# Patient Record
Sex: MALE | Race: WHITE | Employment: OTHER | URBAN - METROPOLITAN AREA
[De-identification: names, ages, dates, MRNs, and addresses within clinical notes are randomized per-mention and may not be internally consistent; named-entity substitution may affect disease eponyms.]

---

## 2017-01-01 ENCOUNTER — HOSPITAL ENCOUNTER (EMERGENCY)
Age: 82
End: 2017-01-25
Attending: EMERGENCY MEDICINE
Payer: SELF-PAY

## 2017-01-01 ENCOUNTER — APPOINTMENT (OUTPATIENT)
Dept: GENERAL RADIOLOGY | Age: 82
End: 2017-01-01
Attending: EMERGENCY MEDICINE
Payer: SELF-PAY

## 2017-01-01 ENCOUNTER — APPOINTMENT (OUTPATIENT)
Dept: CT IMAGING | Age: 82
End: 2017-01-01
Attending: EMERGENCY MEDICINE
Payer: SELF-PAY

## 2017-01-01 VITALS
WEIGHT: 100 LBS | HEART RATE: 77 BPM | BODY MASS INDEX: 15.16 KG/M2 | DIASTOLIC BLOOD PRESSURE: 77 MMHG | OXYGEN SATURATION: 100 % | RESPIRATION RATE: 22 BRPM | HEIGHT: 68 IN | SYSTOLIC BLOOD PRESSURE: 105 MMHG

## 2017-01-01 DIAGNOSIS — T50.902A SUICIDAL OVERDOSE, INITIAL ENCOUNTER (HCC): Primary | ICD-10-CM

## 2017-01-01 LAB
ALBUMIN SERPL BCP-MCNC: 2.7 G/DL (ref 3.5–5)
ALBUMIN/GLOB SERPL: 0.7 {RATIO} (ref 1.1–2.2)
ALP SERPL-CCNC: 81 U/L (ref 45–117)
ALT SERPL-CCNC: 25 U/L (ref 12–78)
ANION GAP BLD CALC-SCNC: 9 MMOL/L (ref 5–15)
APAP SERPL-MCNC: <2 UG/ML (ref 10–30)
ARTERIAL PATENCY WRIST A: YES
AST SERPL W P-5'-P-CCNC: 23 U/L (ref 15–37)
BASE DEFICIT BLD-SCNC: 24 MMOL/L
BASOPHILS # BLD AUTO: 0 K/UL (ref 0–0.1)
BASOPHILS # BLD: 0 % (ref 0–1)
BDY SITE: ABNORMAL
BILIRUB SERPL-MCNC: 0.3 MG/DL (ref 0.2–1)
BUN SERPL-MCNC: 52 MG/DL (ref 6–20)
BUN/CREAT SERPL: 12 (ref 12–20)
CALCIUM SERPL-MCNC: 8 MG/DL (ref 8.5–10.1)
CHLORIDE SERPL-SCNC: 111 MMOL/L (ref 97–108)
CK MB CFR SERPL CALC: 9.6 % (ref 0–2.5)
CK MB SERPL-MCNC: 8.7 NG/ML (ref 5–25)
CK SERPL-CCNC: 91 U/L (ref 39–308)
CO2 SERPL-SCNC: 23 MMOL/L (ref 21–32)
CREAT SERPL-MCNC: 4.22 MG/DL (ref 0.7–1.3)
EOSINOPHIL # BLD: 0.2 K/UL (ref 0–0.4)
EOSINOPHIL NFR BLD: 1 % (ref 0–7)
ERYTHROCYTE [DISTWIDTH] IN BLOOD BY AUTOMATED COUNT: 15.3 % (ref 11.5–14.5)
ETHANOL SERPL-MCNC: <10 MG/DL
GAS FLOW.O2 O2 DELIVERY SYS: ABNORMAL L/MIN
GAS FLOW.O2 SETTING OXYMISER: 12 BPM
GLOBULIN SER CALC-MCNC: 4.1 G/DL (ref 2–4)
GLUCOSE BLD STRIP.AUTO-MCNC: 158 MG/DL (ref 65–100)
GLUCOSE SERPL-MCNC: 99 MG/DL (ref 65–100)
HCO3 BLD-SCNC: 11.6 MMOL/L (ref 22–26)
HCT VFR BLD AUTO: 32 % (ref 36.6–50.3)
HGB BLD-MCNC: 10 G/DL (ref 12.1–17)
INR PPP: 1 (ref 0.9–1.1)
LIPASE SERPL-CCNC: 144 U/L (ref 73–393)
LYMPHOCYTES # BLD AUTO: 12 % (ref 12–49)
LYMPHOCYTES # BLD: 1.5 K/UL (ref 0.8–3.5)
MCH RBC QN AUTO: 28.5 PG (ref 26–34)
MCHC RBC AUTO-ENTMCNC: 31.3 G/DL (ref 30–36.5)
MCV RBC AUTO: 91.2 FL (ref 80–99)
MONOCYTES # BLD: 1 K/UL (ref 0–1)
MONOCYTES NFR BLD AUTO: 8 % (ref 5–13)
NEUTS SEG # BLD: 10 K/UL (ref 1.8–8)
NEUTS SEG NFR BLD AUTO: 79 % (ref 32–75)
O2/TOTAL GAS SETTING VFR VENT: 100 %
PCO2 BLD: 85.3 MMHG (ref 35–45)
PEEP RESPIRATORY: 5 CMH2O
PH BLD: 6.74 [PH] (ref 7.35–7.45)
PLATELET # BLD AUTO: 322 K/UL (ref 150–400)
PO2 BLD: 57 MMHG (ref 80–100)
POTASSIUM SERPL-SCNC: 4.4 MMOL/L (ref 3.5–5.1)
PROT SERPL-MCNC: 6.8 G/DL (ref 6.4–8.2)
PROTHROMBIN TIME: 10 SEC (ref 9–11.1)
RBC # BLD AUTO: 3.51 M/UL (ref 4.1–5.7)
SALICYLATES SERPL-MCNC: <1.7 MG/DL (ref 2.8–20)
SAO2 % BLD: 55 % (ref 92–97)
SERVICE CMNT-IMP: ABNORMAL
SODIUM SERPL-SCNC: 143 MMOL/L (ref 136–145)
SPECIMEN TYPE: ABNORMAL
TROPONIN I SERPL-MCNC: 2.18 NG/ML
VENTILATION MODE VENT: ABNORMAL
VT SETTING VENT: 400 ML
WBC # BLD AUTO: 12.6 K/UL (ref 4.1–11.1)

## 2017-01-01 PROCEDURE — 99285 EMERGENCY DEPT VISIT HI MDM: CPT

## 2017-01-01 PROCEDURE — 85025 COMPLETE CBC W/AUTO DIFF WBC: CPT | Performed by: EMERGENCY MEDICINE

## 2017-01-01 PROCEDURE — 84484 ASSAY OF TROPONIN QUANT: CPT | Performed by: EMERGENCY MEDICINE

## 2017-01-01 PROCEDURE — 36600 WITHDRAWAL OF ARTERIAL BLOOD: CPT

## 2017-01-01 PROCEDURE — 80307 DRUG TEST PRSMV CHEM ANLYZR: CPT | Performed by: EMERGENCY MEDICINE

## 2017-01-01 PROCEDURE — 74011250636 HC RX REV CODE- 250/636

## 2017-01-01 PROCEDURE — 74011000258 HC RX REV CODE- 258: Performed by: EMERGENCY MEDICINE

## 2017-01-01 PROCEDURE — 82962 GLUCOSE BLOOD TEST: CPT

## 2017-01-01 PROCEDURE — 83690 ASSAY OF LIPASE: CPT | Performed by: EMERGENCY MEDICINE

## 2017-01-01 PROCEDURE — 93005 ELECTROCARDIOGRAM TRACING: CPT

## 2017-01-01 PROCEDURE — 36415 COLL VENOUS BLD VENIPUNCTURE: CPT | Performed by: EMERGENCY MEDICINE

## 2017-01-01 PROCEDURE — 70450 CT HEAD/BRAIN W/O DYE: CPT

## 2017-01-01 PROCEDURE — 94002 VENT MGMT INPAT INIT DAY: CPT

## 2017-01-01 PROCEDURE — 85610 PROTHROMBIN TIME: CPT | Performed by: EMERGENCY MEDICINE

## 2017-01-01 PROCEDURE — 77030008683 HC TU ET CUF COVD -A

## 2017-01-01 PROCEDURE — 77030022643 HC PAD DEFIB AD HRTSTRT PHL -B

## 2017-01-01 PROCEDURE — 74011250636 HC RX REV CODE- 250/636: Performed by: EMERGENCY MEDICINE

## 2017-01-01 PROCEDURE — 94762 N-INVAS EAR/PLS OXIMTRY CONT: CPT

## 2017-01-01 PROCEDURE — 82550 ASSAY OF CK (CPK): CPT | Performed by: EMERGENCY MEDICINE

## 2017-01-01 PROCEDURE — 92950 HEART/LUNG RESUSCITATION CPR: CPT

## 2017-01-01 PROCEDURE — 74011000250 HC RX REV CODE- 250: Performed by: EMERGENCY MEDICINE

## 2017-01-01 PROCEDURE — 96374 THER/PROPH/DIAG INJ IV PUSH: CPT

## 2017-01-01 PROCEDURE — 77030008768 HC TU NG VYGC -A

## 2017-01-01 PROCEDURE — 96361 HYDRATE IV INFUSION ADD-ON: CPT

## 2017-01-01 PROCEDURE — 82803 BLOOD GASES ANY COMBINATION: CPT

## 2017-01-01 PROCEDURE — 80053 COMPREHEN METABOLIC PANEL: CPT | Performed by: EMERGENCY MEDICINE

## 2017-01-01 PROCEDURE — 31500 INSERT EMERGENCY AIRWAY: CPT

## 2017-01-01 RX ORDER — ATROPINE SULFATE 1 MG/ML
INJECTION, SOLUTION INTRAVENOUS
Status: COMPLETED | OUTPATIENT
Start: 2017-01-01 | End: 2017-01-01

## 2017-01-01 RX ORDER — ETOMIDATE 2 MG/ML
20 INJECTION INTRAVENOUS
Status: COMPLETED | OUTPATIENT
Start: 2017-01-01 | End: 2017-01-01

## 2017-01-01 RX ORDER — EPINEPHRINE 0.1 MG/ML
INJECTION INTRACARDIAC; INTRAVENOUS
Status: COMPLETED | OUTPATIENT
Start: 2017-01-01 | End: 2017-01-01

## 2017-01-01 RX ORDER — LORAZEPAM 2 MG/ML
INJECTION INTRAMUSCULAR
Status: COMPLETED
Start: 2017-01-01 | End: 2017-01-01

## 2017-01-01 RX ORDER — HEPARIN SODIUM 10000 [USP'U]/100ML
12-25 INJECTION, SOLUTION INTRAVENOUS
Status: DISCONTINUED | OUTPATIENT
Start: 2017-01-01 | End: 2017-01-26 | Stop reason: HOSPADM

## 2017-01-01 RX ORDER — ASPIRIN 300 MG/1
300 SUPPOSITORY RECTAL
Status: DISCONTINUED | OUTPATIENT
Start: 2017-01-01 | End: 2017-01-26 | Stop reason: HOSPADM

## 2017-01-01 RX ORDER — ROCURONIUM BROMIDE 10 MG/ML
100 INJECTION, SOLUTION INTRAVENOUS
Status: COMPLETED | OUTPATIENT
Start: 2017-01-01 | End: 2017-01-01

## 2017-01-01 RX ORDER — LORAZEPAM 2 MG/ML
2 INJECTION INTRAMUSCULAR
Status: COMPLETED | OUTPATIENT
Start: 2017-01-01 | End: 2017-01-01

## 2017-01-01 RX ORDER — SODIUM BICARBONATE 1 MEQ/ML
SYRINGE (ML) INTRAVENOUS
Status: COMPLETED | OUTPATIENT
Start: 2017-01-01 | End: 2017-01-01

## 2017-01-01 RX ORDER — SODIUM BICARBONATE 1 MEQ/ML
50 SYRINGE (ML) INTRAVENOUS
Status: COMPLETED | OUTPATIENT
Start: 2017-01-01 | End: 2017-01-01

## 2017-01-01 RX ORDER — PROPOFOL 10 MG/ML
5-50 VIAL (ML) INTRAVENOUS
Status: DISCONTINUED | OUTPATIENT
Start: 2017-01-01 | End: 2017-01-26 | Stop reason: HOSPADM

## 2017-01-01 RX ADMIN — EPINEPHRINE 1 MG: 0.1 INJECTION, SOLUTION ENDOTRACHEAL; INTRACARDIAC; INTRAVENOUS at 19:16

## 2017-01-01 RX ADMIN — ROCURONIUM BROMIDE 100 MG: 10 INJECTION, SOLUTION INTRAVENOUS at 18:55

## 2017-01-01 RX ADMIN — EPINEPHRINE 1 MG: 0.1 INJECTION, SOLUTION ENDOTRACHEAL; INTRACARDIAC; INTRAVENOUS at 20:18

## 2017-01-01 RX ADMIN — I.V. FAT EMULSION 500 ML/HR: 20 EMULSION INTRAVENOUS at 19:58

## 2017-01-01 RX ADMIN — SODIUM CHLORIDE 1000 ML: 900 INJECTION, SOLUTION INTRAVENOUS at 19:13

## 2017-01-01 RX ADMIN — LORAZEPAM 2 MG: 2 INJECTION INTRAMUSCULAR at 18:55

## 2017-01-01 RX ADMIN — POISON ADSORBENT 50 G: 50 SUSPENSION ORAL at 19:06

## 2017-01-01 RX ADMIN — I.V. FAT EMULSION 68.1 ML: 20 EMULSION INTRAVENOUS at 19:45

## 2017-01-01 RX ADMIN — EPINEPHRINE 1 MG: 0.1 INJECTION, SOLUTION ENDOTRACHEAL; INTRACARDIAC; INTRAVENOUS at 19:26

## 2017-01-01 RX ADMIN — SODIUM BICARBONATE 50 MEQ: 84 INJECTION, SOLUTION INTRAVENOUS at 19:26

## 2017-01-01 RX ADMIN — EPINEPHRINE 1 MG: 0.1 INJECTION, SOLUTION ENDOTRACHEAL; INTRACARDIAC; INTRAVENOUS at 19:49

## 2017-01-01 RX ADMIN — EPINEPHRINE 1 MG: 0.1 INJECTION, SOLUTION ENDOTRACHEAL; INTRACARDIAC; INTRAVENOUS at 19:22

## 2017-01-01 RX ADMIN — EPINEPHRINE 3 MCG/MIN: 1 INJECTION, SOLUTION INTRAMUSCULAR; INTRAVENOUS; SUBCUTANEOUS at 19:48

## 2017-01-01 RX ADMIN — SODIUM BICARBONATE 50 MEQ: 84 INJECTION INTRAVENOUS at 20:03

## 2017-01-01 RX ADMIN — ATROPINE SULFATE 1 MG: 1 INJECTION, SOLUTION INTRAMUSCULAR; INTRAVENOUS; SUBCUTANEOUS at 19:07

## 2017-01-01 RX ADMIN — EPINEPHRINE 1 MG: 0.1 INJECTION, SOLUTION ENDOTRACHEAL; INTRACARDIAC; INTRAVENOUS at 19:07

## 2017-01-01 RX ADMIN — NOREPINEPHRINE BITARTRATE 4 MCG/MIN: 1 INJECTION, SOLUTION, CONCENTRATE INTRAVENOUS at 19:13

## 2017-01-01 RX ADMIN — LORAZEPAM 2 MG: 2 INJECTION INTRAMUSCULAR; INTRAVENOUS at 18:55

## 2017-01-01 RX ADMIN — EPINEPHRINE 1 MG: 0.1 INJECTION, SOLUTION ENDOTRACHEAL; INTRACARDIAC; INTRAVENOUS at 19:30

## 2017-01-01 RX ADMIN — ETOMIDATE 20 MG: 2 INJECTION, SOLUTION INTRAVENOUS at 18:55

## 2017-01-25 NOTE — ED NOTES
Pt became unresponsive and then had tonic clonic seizure lasting approximately 1-2 minutes. Pt suctioned and NRB placed. Dr Jeannine Kwok to bedside.  RSI at bedside, preparing for intubation

## 2017-01-25 NOTE — ED PROVIDER NOTES
HPI Comments: 80 y.o. male with past medical history significant for bladder cancer who presents via EMS with chief complaint of overdose. Per EMS, pt claims he took [de-identified] wellbutrin in an attempt to kill himself approximately 1 hour ago. Pt denies any prior hx of suicide attempts. There are no other acute medical concerns at this time. Social hx: Pt states he smokes. PCP: No primary care provider on file. Note written by Deandra Mcfadden, as dictated by Emelia Rinaldi MD 6:45 PM      The history is provided by the patient. No  was used. Past Medical History:   Diagnosis Date    [facility] ([facility]     Chronic obstructive pulmonary disease ([facility]     Depression     Hypertension     Kidney disease        History reviewed. No pertinent past surgical history. History reviewed. No pertinent family history. Social History     Social History    Marital status: N/A     Spouse name: N/A    Number of children: N/A    Years of education: N/A     Occupational History    Not on file. Social History Main Topics    Smoking status: Not on file    Smokeless tobacco: Not on file    Alcohol use Not on file    Drug use: Not on file    Sexual activity: Not on file     Other Topics Concern    Not on file     Social History Narrative         ALLERGIES: Review of patient's allergies indicates not on file. Review of Systems   Constitutional: Negative for chills, diaphoresis, fatigue and fever. HENT: Negative for congestion, rhinorrhea, sinus pressure, sore throat, trouble swallowing and voice change. Eyes: Negative for photophobia and visual disturbance. Respiratory: Negative for cough, chest tightness and shortness of breath. Cardiovascular: Negative for chest pain, palpitations and leg swelling. Gastrointestinal: Negative for abdominal pain, blood in stool, constipation, diarrhea, nausea and vomiting.    Musculoskeletal: Negative for arthralgias, myalgias and neck pain. Neurological: Negative for dizziness, weakness, light-headedness, numbness and headaches. Psychiatric/Behavioral: Positive for suicidal ideas. All other systems reviewed and are negative. Vitals:    01/25/17 1856 01/25/17 1904 01/25/17 1905 01/25/17 2000   BP:  (!) 51/40  105/77   Pulse:  (!) 46 64 87   Resp:  19 12 12   SpO2:   (!) 10% 97%   Weight: 45.4 kg (100 lb)      Height: 5' 8\" (1.727 m)               Physical Exam   Constitutional: He is oriented to person, place, and time. He appears well-developed and well-nourished. No distress. 'yes - I'm trying to kill myself'    BIBEMS NAD, AxOx4, speaking in complete sentences, carrera in place;      HENT:   Head: Normocephalic and atraumatic. Nose: Nose normal.   Mouth/Throat: Oropharynx is clear and moist.   Cn intact     Eyes: Conjunctivae and EOM are normal. Pupils are equal, round, and reactive to light. Right eye exhibits no discharge. Left eye exhibits no discharge. Neck: Normal range of motion. Neck supple. Cardiovascular: Normal rate, regular rhythm, normal heart sounds and intact distal pulses. Exam reveals no gallop and no friction rub. No murmur heard. Pulmonary/Chest: Effort normal and breath sounds normal. No respiratory distress. He has no wheezes. He has no rales. He exhibits no tenderness. Abdominal: Soft. Bowel sounds are normal. He exhibits no distension and no mass. There is no tenderness. There is no rebound and no guarding. nttp     Genitourinary:   Genitourinary Comments: Chronic carrera/ placed at [facility];    Musculoskeletal: Normal range of motion. He exhibits no edema, tenderness or deformity. Lymphadenopathy:     He has no cervical adenopathy. Neurological: He is alert and oriented to person, place, and time. He has normal reflexes. No cranial nerve deficit. Coordination normal.   pt has motor/ CV/ Sensation grossly intact to all extremities, R = L in strength;   Skin: Skin is warm and dry.  No rash noted. No erythema. Psychiatric: He has a normal mood and affect. Nursing note and vitals reviewed. MDM  Number of Diagnoses or Management Options  Suicidal overdose, initial encounter [facility]):   Risk of Complications, Morbidity, and/or Mortality  Presenting problems: high  Diagnostic procedures: high  Management options: high    Critical Care  Total time providing critical care:  minutes (75 minutes)    Patient Progress  Patient progress: other (comment)    ED Course       Procedures     Chief Complaint   Patient presents with    Drug Overdose       8:08 PM  The patients presenting problems have been discussed, and they are in agreement with the care plan formulated and outlined with them. I have encouraged them to ask questions as they arise throughout their visit.     MEDICATIONS GIVEN:  Medications   sodium chloride 0.9 % bolus infusion 1,000 mL (1,000 mL IntraVENous New Bag 1/25/17 1913)   propofol (DIPRIVAN) infusion (not administered)   NOREPINephrine (LEVOPHED) 8,000 mcg in dextrose 5% 250 mL infusion (19 mcg/min IntraVENous Rate Change 1/25/17 1942)   fat emulsion 20% (LIPOSYN, INTRALIPID) infusion (not administered)   activated charcoal-sorbitol (ACTIDOSE) oral suspension 50 g (50 g Oral Given by Provider 1/25/17 1906)   rocuronium [facility]) injection 100 mg (100 mg IntraVENous Given 1/25/17 1854)   etomidate (AMIDATE) 2 mg/mL injection 20 mg (20 mg IntraVENous Given 1/25/17 1854)   LORazepam (ATIVAN) injection 2 mg (2 mg IntraVENous Given 1/25/17 1855)   atropine 1 mg/mL injection (1 mg IntraVENous Given 1/25/17 1907)   EPINEPHrine (ADRENALIN) 0.1 mg/mL syringe (1 mg IntraVENous Given 1/25/17 1907)   EPINEPHrine (ADRENALIN) 0.1 mg/mL syringe (1 mg IntraVENous Given 1/25/17 1949)   sodium bicarbonate 8.4 % (1 mEq/mL) injection (50 mEq IntraVENous Given 1/25/17 1926)   fat emulsion 20% (LIPOSYN, INTRALIPID) infusion 68.1 mL (68.1 mL IntraVENous Given 1/25/17 1945)   EPINEPHrine (ADRENALIN) 4,000 mcg in 0.9% sodium chloride 250 mL infusion (10 mcg/min  Rate Change 1/25/17 2003)   sodium bicarbonate 8.4 % (1 mEq/mL) injection 50 mEq (50 mEq IntraVENous Given 1/25/17 2003)       LABS REVIEWED:  Labs Reviewed   TROPONIN I - Abnormal; Notable for the following:        Result Value    Troponin-I, Qt. 2.18 (*)     All other components within normal limits   CBC WITH AUTOMATED DIFF - Abnormal; Notable for the following:     WBC 12.6 (*)     RBC 3.51 (*)     HGB 10.0 (*)     HCT 32.0 (*)     RDW 15.3 (*)     NEUTROPHILS 79 (*)     ABS. NEUTROPHILS 10.0 (*)     All other components within normal limits   CK W/ CKMB & INDEX - Abnormal; Notable for the following:     CK - MB 8.7 (*)     CK-MB Index 9.6 (*)     All other components within normal limits   METABOLIC PANEL, COMPREHENSIVE - Abnormal; Notable for the following:     Chloride 111 (*)     BUN 52 (*)     Creatinine 4.22 (*)     GFR est AA 16 (*)     GFR est non-AA 14 (*)     Calcium 8.0 (*)     Albumin 2.7 (*)     Globulin 4.1 (*)     A-G Ratio 0.7 (*)     All other components within normal limits   SALICYLATE - Abnormal; Notable for the following:     SALICYLATE <7.3 (*)     All other components within normal limits   ACETAMINOPHEN - Abnormal; Notable for the following:     ACETAMINOPHEN <2 (*)     All other components within normal limits   GLUCOSE, POC - Abnormal; Notable for the following:     Glucose (POC) 158 (*)     All other components within normal limits   PROTHROMBIN TIME + INR   ETHYL ALCOHOL   LIPASE   URINALYSIS W/MICROSCOPIC   DRUG SCREEN, URINE       RADIOLOGY RESULTS:  The following have been ordered and reviewed:  _____________________________________________________________________  _____________________________________________________________________    EKG interpretation: (Preliminary)  Rhythm:  rhythm; and regular .  Rate (approx.): 86; Axis: LAD; P wave: normal; QRS interval: normal ; ST/T wave: normal; Negative acute significant segmental elevations/ noted QRS widening/ inverted t waves/ st depressions in leads V4/ V5; no old study/ QTc = 485    PROCEDURES:        CONSULTATIONS:       PROGRESS NOTES:      DIAGNOSIS:    1. Suicidal overdose, initial encounter [facility])        PLAN:  1-  Suicide by wellbutrin; completed;       ED COURSE: The patients hospital course has been uncomplicated. 6:50   Spoke with [facility]; monitor for seizures;     PROGRESS NOTE:  6:49 PM  Pt went into seizure for approximately 1 min    PROGRESS NOTE:  6:53 PM  Pt seizing again    PROGRESS NOTE:  7:06 PM  Pt went into arrest, compressions started    PROGRESS NOTE:  7:16 PM  Pt went into arrest against, compressions started. No pulse noted    PROGRESS NOTE:  7:37 PM  Dr. Dominga Davalos has seen EKG, calling code ICE. Will call hospitalist    7:30 PM  [facility] contacted again/ notified about arresting/ change in status / rec intralipid be given;     PROGRESS NOTE:  7:44 PM  No pulse, compressions started    PROGRESS NOTE:  7:57 PM  Caregiver states they found pt with pills in his hand at 1730. Updated pt's caregiver and friend of poor prognosis. Dr. Dominga Davalos talking with caregiver as well. Taking pt to head CT    ED Subsquent EKG interpretation:  Rhythm: normal sinus rhythm with a 1st degree AV block; and regular . Rate (approx.): 94; Axis: left axis deviation; P wave: 1st degree AV Block ; QRS interval: normal ; ST/T wave: significant depressions in leads V3- V6; ; in  Lead: ; Other findings: abnormal ekg. This EKG was interpreted by Shan Sen MD,ED Provider. CONSULT NOTE:  8:13 PM Shan Sen MD spoke with Dr. Zarate Her, Consult for Hospitalist.  Discussed available diagnostic tests and clinical findings. He is in agreement with care plans as outlined. Dr. Zarate Her will admit. PROGRESS NOTE:  8:22 PM  Pt head CT show bilateral thalamic Infarcts. Pt returned from CT and coded again. Utilized ultrasound, which showed cardiac activity inconsistent with life.  Pt pronounced dead at 20:22;     8:44 PM  Spoke with ME x 15 minutes;

## 2017-01-25 NOTE — ED TRIAGE NOTES
Triage Note: [facility] [place]. He reports taking overdose of wellbutrin (approximately 80) in attempt to kill himself. Pt has bladder cancer. He says he is tired of living.

## 2017-01-26 LAB
ATRIAL RATE: 87 BPM
ATRIAL RATE: 94 BPM
CALCULATED P AXIS, ECG09: -12 DEGREES
CALCULATED R AXIS, ECG10: -69 DEGREES
CALCULATED R AXIS, ECG10: -71 DEGREES
CALCULATED T AXIS, ECG11: 104 DEGREES
CALCULATED T AXIS, ECG11: 110 DEGREES
DIAGNOSIS, 93000: NORMAL
DIAGNOSIS, 93000: NORMAL
P-R INTERVAL, ECG05: 326 MS
Q-T INTERVAL, ECG07: 406 MS
Q-T INTERVAL, ECG07: 406 MS
QRS DURATION, ECG06: 118 MS
QRS DURATION, ECG06: 120 MS
QTC CALCULATION (BEZET), ECG08: 485 MS
QTC CALCULATION (BEZET), ECG08: 507 MS
VENTRICULAR RATE, ECG03: 86 BPM
VENTRICULAR RATE, ECG03: 94 BPM

## 2017-01-26 NOTE — CONSULTS
[address] [facility] [address]    [facility]       Name:  Yesica Perez   MR#:  [id number]   :  10/03/1932   Account #:  [de-identified]    Date of Consultation:  2017   Date of Adm:  2017       HISTORY OF PRESENT ILLNESS: I was called to see this patient   when he had a cardiac arrest in the emergency room and was being   resuscitated, I got there, Dr. Chadwick Au indicates that the patient was   awake and alert, and talking to him when he got to the emergency   room by rescue squad after ingesting approximately 80 pills of   Wellbutrin. The patient expressed to Dr. Chadwick Au that he did not want to   live anymore and he apparently told Dr. Chadwick Au there he had bladder   cancer. The patient had a seizure and then went into a PEA arrest and   was resuscitated multiple times before return of spontaneous   circulation. The patient gets his medical care at the [facility] and   was recently there for a several day stay for reasons that are not   entirely clear. He lives with a woman who is basically has caretaker. They have been apparently friends for a long time and she took over   his care when he became homeless. In any event, one of the patient's   problems that was identified at the [facility] was renal failure and   the doctors at the [facility] told the patient that they would not offer   hemodialysis because \"it would not do him any good. \" We have no   other specific medical history on this patient. PHYSICAL EXAMINATION   GENERAL: This is a well-developed, very small, very light man who at   the time I saw him, he was getting CPR with chest compressions. VITAL SIGNS: Included a heart rate of about 100. Blood pressure with   Levophed support in the 80s and 90s. HEENT: Unremarkable. NECK: Veins are not distended. CHEST WALL: Small. LUNGS: Clear when he was being bagged. HEART: Sounds were regular, moderate, somewhat muffled.  No S3   gallop or friction rub. No significant murmurs. ABDOMEN: Distended and soft with return of spontaneous circulation   and his femoral artery pulses were palpable. EXTREMITIES: With no edema. NEUROLOGICAL: Is deferred as the patient is unconscious. LABORATORY DATA: Hemoglobin is 10.0, hematocrit 32.0, platelets   111,302. White count 12,600. Chemistries: BUN 52, creatinine 4.22,   potassium 4.4. Troponin is 2.18. Blood gases at 8 o'clock pH 6.741,   pCO2 at 85, pO2 at 57. DIAGNOSTIC DATA: His EKG demonstrates sinus rhythm, left axis   deviation and ST-segment depression in the lateral leads, and a   widened QRS complex. IMPRESSION:   1. The patient had a PEA arrest after seizure activity, presumably   brought on by the ingestion of overdose of Wellbutrin. 2. The patient is not awake and has no spontaneous movement, and   degree of recovery is unclear. After discussion with Dr. Chadiwck Au, the patient will be admitted to the   hospitalist service and placed on a CODE ICE policy. There is   apparently no family and only his friend who is caretaker was there to   give us any information. We will request records from the [place] as well.      Thank you for this referral.         MD BUDDY Quintero / Kelsi Crocker   D:  01/25/2017   20:51   T:  01/25/2017   22:02   Job #:  [id number]

## 2017-01-26 NOTE — ED NOTES
Per Dr. Sharan Tomas patient is NOT released by ME at this time, but ok with transport to the [facility]. Post mortem care provided to an extent, all tubes/lines remain in place. Per pastoral care family declines to see patient at this time. Will call for transport to [facility].

## 2017-01-26 NOTE — ED NOTES
Spoke with Telma Chanel at [facility] about patient status; staff to call back when patient status changed for evaluation of tissue/eye donation.

## 2017-01-26 NOTE — PROGRESS NOTES
Spiritual Care Assessment/Progress Notes    Eduardo Monge [id number]  [id number]    10/3/1932  80 y.o.  male    Patient Telephone Number: There is no home phone number on file. Adventism Affiliation: No Scientology   Language: English   No emergency contact information on file. There are no active problems to display for this patient. Date: 1/25/2017       Level of Adventism/Spiritual Activity:  []         Involved in doretha tradition/spiritual practice    []         Not involved in doretha tradition/spiritual practice  [x]         Spiritually oriented    []         Claims no spiritual orientation    []         seeking spiritual identity  []         Feels alienated from Quaker practice/tradition  []         Feels angry about Quaker practice/tradition  []         Spirituality/Quaker tradition a resource for coping at this time.   []         Not able to assess due to medical condition    Services Provided Today:  [x]         crisis intervention    []         reading Scriptures  [x]         spiritual assessment    [x]         prayer  [x]         empathic listening/emotional support  []         rites and rituals (cite in comments)  [x]         life review     []         Quaker support  []         theological development   []         advocacy  []         ethical dialog     []         blessing  [x]         bereavement support    [x]         support to family  []         anticipatory grief support   []         help with AMD  []         spiritual guidance    []         meditation      Spiritual Care Needs  []         Emotional Support  []         Spiritual/Adventism Care  [x]         Loss/Adjustment  []         Advocacy/Referral                /Ethics  []         No needs expressed at               this time  []         Other: (note in               comments)  [address]  []         Follow up visits with               pt/family  []         Provide materials  []         Schedule sacraments  [] Contact Community               Clergy  [x]         Follow up as needed  []         Other: (note in               comments)     Responded to [facility]/ICE in [address]. Provided support to patient's caretaker and friend Binta Estes who were in family consult room as [de-identified] provided updates. After Dr. Foreign Koehler and Dr. Dominga Davalos stepped out, led in processing. Both friends engaged in life review and shared details of how they had got to meet patient and developed friendships.  present with friends when Dr. Foreign Koehler informed that patient had passed. Provided prayer as caregiver had mentioned that patient had grown up Tenriism but converted to Adventism about 10 years ago. Accompanied friends to see patient at their request. Patient has no family as his wife had passed and he had not seen his daughter for over 21 years. DEMETRIS Hoyt. Mook Caballero

## 2017-01-26 NOTE — ED NOTES
Spoke with Cara Posada with [facility]. She states due to the patients age there will be not donation. Pt is released from their perspective.

## 2017-01-26 NOTE — CONSULTS
Cardiology Consultation #[id number]  Impressions:  1. PEA arrest caused by Wellbutrin overdose; bedside echo shows severe global hypokinesis (EF~ 10%), no regional wall motion abnormalities and no pericardial effusion  2. Coma post resuscitation  3. Elevated cardiac enzymes consistent with a NSTEMI  4. Renal failure: he was seen at the [place] for this several days ago and told that he would not be offered dialysis  5. Incomplete data base. Mr Raoul Zamora receives all of his medical care at the [place] facility  Plan:  1. Admit to the hospitalist service  2. Records will be requested from the [facility]  3.  Placed in the Code Ice protocol  Prognosis for recovery is limited  Thank you for this referral  Zachariah Ariza MD